# Patient Record
Sex: MALE | Race: WHITE | NOT HISPANIC OR LATINO | URBAN - METROPOLITAN AREA
[De-identification: names, ages, dates, MRNs, and addresses within clinical notes are randomized per-mention and may not be internally consistent; named-entity substitution may affect disease eponyms.]

---

## 2020-12-01 ENCOUNTER — APPOINTMENT (RX ONLY)
Dept: URBAN - METROPOLITAN AREA CLINIC 86 | Facility: CLINIC | Age: 55
Setting detail: DERMATOLOGY
End: 2020-12-01

## 2020-12-01 VITALS — TEMPERATURE: 98.7 F

## 2020-12-01 DIAGNOSIS — L82.1 OTHER SEBORRHEIC KERATOSIS: ICD-10-CM

## 2020-12-01 PROCEDURE — 99202 OFFICE O/P NEW SF 15 MIN: CPT

## 2020-12-01 PROCEDURE — ? ADDITIONAL NOTES

## 2020-12-01 PROCEDURE — ? COUNSELING

## 2020-12-01 ASSESSMENT — LOCATION DETAILED DESCRIPTION DERM: LOCATION DETAILED: STERNUM

## 2020-12-01 ASSESSMENT — LOCATION ZONE DERM: LOCATION ZONE: TRUNK

## 2020-12-01 ASSESSMENT — LOCATION SIMPLE DESCRIPTION DERM: LOCATION SIMPLE: CHEST

## 2022-05-24 ENCOUNTER — APPOINTMENT (RX ONLY)
Dept: URBAN - METROPOLITAN AREA CLINIC 86 | Facility: CLINIC | Age: 57
Setting detail: DERMATOLOGY
End: 2022-05-24

## 2022-05-24 DIAGNOSIS — L82.0 INFLAMED SEBORRHEIC KERATOSIS: ICD-10-CM

## 2022-05-24 PROCEDURE — 17110 DESTRUCTION B9 LES UP TO 14: CPT

## 2022-05-24 PROCEDURE — ? COUNSELING

## 2022-05-24 PROCEDURE — ? LIQUID NITROGEN

## 2022-05-24 PROCEDURE — ? FULL BODY SKIN EXAM - DECLINED

## 2022-05-24 PROCEDURE — ? ADDITIONAL NOTES

## 2022-05-24 ASSESSMENT — LOCATION SIMPLE DESCRIPTION DERM: LOCATION SIMPLE: CHEST

## 2022-05-24 ASSESSMENT — LOCATION ZONE DERM: LOCATION ZONE: TRUNK

## 2022-05-24 ASSESSMENT — LOCATION DETAILED DESCRIPTION DERM: LOCATION DETAILED: STERNUM

## 2022-05-24 NOTE — PROCEDURE: LIQUID NITROGEN
Medical Necessity Clause: This procedure was medically necessary because the lesions that were treated were:
Spray Paint Text: The liquid nitrogen was applied to the skin utilizing a spray paint frosting technique.
Consent: The patient's consent was obtained including but not limited to risks of crusting, scabbing, blistering, scarring, darker or lighter pigmentary change, recurrence, incomplete removal and infection.
Show Aperture Variable?: Yes
Post-Care Instructions: I reviewed with the patient in detail post-care instructions. Patient is to wear sunprotection, and avoid picking at any of the treated lesions. Pt may apply Vaseline to crusted or scabbing areas.
Spray Paint Technique: No
Medical Necessity Information: It is in your best interest to select a reason for this procedure from the list below. All of these items fulfill various CMS LCD requirements except the new and changing color options.
Detail Level: Detailed

## 2024-03-19 ENCOUNTER — NEW PATIENT (OUTPATIENT)
Dept: URBAN - METROPOLITAN AREA CLINIC 48 | Facility: LOCATION | Age: 59
End: 2024-03-19

## 2024-03-19 DIAGNOSIS — H01.004: ICD-10-CM

## 2024-03-19 DIAGNOSIS — H01.001: ICD-10-CM

## 2024-03-19 DIAGNOSIS — H52.4: ICD-10-CM

## 2024-03-19 DIAGNOSIS — H25.13: ICD-10-CM

## 2024-03-19 DIAGNOSIS — Z01.01: ICD-10-CM

## 2024-03-19 DIAGNOSIS — H40.013: ICD-10-CM

## 2024-03-19 PROCEDURE — 92015 DETERMINE REFRACTIVE STATE: CPT

## 2024-03-19 PROCEDURE — 92004 COMPRE OPH EXAM NEW PT 1/>: CPT

## 2024-03-19 ASSESSMENT — VISUAL ACUITY
OD_CC: 20/20
OU_CC: 20/20
OD_SC: 20/80-1
OS_GLARE: 20/30
OU_CC: J1+ @ 16-18"
OS_SC: 20/70+1
OD_GLARE: 20/30
OS_GLARE: 20/20
OS_CC: 20/20
OD_GLARE: 20/20

## 2024-03-19 ASSESSMENT — KERATOMETRY
OS_K1POWER_DIOPTERS: 45.00
OD_K1POWER_DIOPTERS: 44.50
OS_AXISANGLE2_DEGREES: 85
OD_AXISANGLE_DEGREES: 160
OS_AXISANGLE_DEGREES: 175
OD_K2POWER_DIOPTERS: 44.00
OD_AXISANGLE2_DEGREES: 70
OS_K2POWER_DIOPTERS: 43.75

## 2024-03-19 ASSESSMENT — TONOMETRY
OS_IOP_MMHG: 15
OD_IOP_MMHG: 15

## 2024-05-29 ENCOUNTER — DIAGNOSTICS ONLY (OUTPATIENT)
Dept: URBAN - METROPOLITAN AREA CLINIC 48 | Facility: LOCATION | Age: 59
End: 2024-05-29

## 2024-05-29 DIAGNOSIS — H40.013: ICD-10-CM

## 2024-05-29 PROCEDURE — 92083 EXTENDED VISUAL FIELD XM: CPT

## 2024-05-29 PROCEDURE — 76514 ECHO EXAM OF EYE THICKNESS: CPT

## 2024-05-29 PROCEDURE — 92133 CPTRZD OPH DX IMG PST SGM ON: CPT

## 2024-05-29 ASSESSMENT — KERATOMETRY
OD_K2POWER_DIOPTERS: 44.00
OD_K1POWER_DIOPTERS: 44.50
OD_AXISANGLE2_DEGREES: 70
OS_AXISANGLE2_DEGREES: 85
OS_K1POWER_DIOPTERS: 45.00
OD_AXISANGLE_DEGREES: 160
OS_AXISANGLE_DEGREES: 175
OS_K2POWER_DIOPTERS: 43.75